# Patient Record
Sex: MALE | Race: WHITE | NOT HISPANIC OR LATINO | URBAN - METROPOLITAN AREA
[De-identification: names, ages, dates, MRNs, and addresses within clinical notes are randomized per-mention and may not be internally consistent; named-entity substitution may affect disease eponyms.]

---

## 2019-08-01 ENCOUNTER — EMERGENCY (EMERGENCY)
Facility: HOSPITAL | Age: 49
LOS: 1 days | Discharge: HOME | End: 2019-08-01
Attending: EMERGENCY MEDICINE | Admitting: EMERGENCY MEDICINE
Payer: COMMERCIAL

## 2019-08-01 VITALS
OXYGEN SATURATION: 100 % | WEIGHT: 190.04 LBS | RESPIRATION RATE: 17 BRPM | TEMPERATURE: 99 F | SYSTOLIC BLOOD PRESSURE: 129 MMHG | HEART RATE: 59 BPM | DIASTOLIC BLOOD PRESSURE: 89 MMHG | HEIGHT: 69 IN

## 2019-08-01 VITALS
HEART RATE: 60 BPM | RESPIRATION RATE: 17 BRPM | SYSTOLIC BLOOD PRESSURE: 128 MMHG | OXYGEN SATURATION: 100 % | DIASTOLIC BLOOD PRESSURE: 80 MMHG

## 2019-08-01 PROCEDURE — 72125 CT NECK SPINE W/O DYE: CPT | Mod: 26

## 2019-08-01 PROCEDURE — 99284 EMERGENCY DEPT VISIT MOD MDM: CPT | Mod: 25

## 2019-08-01 PROCEDURE — 12052 INTMD RPR FACE/MM 2.6-5.0 CM: CPT

## 2019-08-01 PROCEDURE — 70486 CT MAXILLOFACIAL W/O DYE: CPT | Mod: 26

## 2019-08-01 RX ORDER — TETANUS TOXOID, REDUCED DIPHTHERIA TOXOID AND ACELLULAR PERTUSSIS VACCINE, ADSORBED 5; 2.5; 8; 8; 2.5 [IU]/.5ML; [IU]/.5ML; UG/.5ML; UG/.5ML; UG/.5ML
0.5 SUSPENSION INTRAMUSCULAR ONCE
Refills: 0 | Status: COMPLETED | OUTPATIENT
Start: 2019-08-01 | End: 2019-08-01

## 2019-08-01 RX ORDER — IBUPROFEN 200 MG
600 TABLET ORAL ONCE
Refills: 0 | Status: COMPLETED | OUTPATIENT
Start: 2019-08-01 | End: 2019-08-01

## 2019-08-01 RX ADMIN — Medication 600 MILLIGRAM(S): at 08:47

## 2019-08-01 RX ADMIN — TETANUS TOXOID, REDUCED DIPHTHERIA TOXOID AND ACELLULAR PERTUSSIS VACCINE, ADSORBED 0.5 MILLILITER(S): 5; 2.5; 8; 8; 2.5 SUSPENSION INTRAMUSCULAR at 08:47
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
## 2019-08-01 NOTE — ED PROVIDER NOTE - PHYSICAL EXAMINATION
SKIN:       2-3 cm laceration over bottom of chin       large abrasion over R elbow       abrasion over L knuckle       abrasion over R knee    MSK: NO tenderness over 5 metacarpal regions of hands b/l, pt has full  strength and full  ROM.          NO cervical neck tenderness, pt has full ROM of neck          NO tenderness over knees b/l, pt has full SKIN:       2-3 cm laceration over bottom of chin       large abrasion over R elbow       abrasion over L knuckle       abrasion over R knee    MSK: NO tenderness over 5 metacarpal regions of hands b/l, pt has full  strength and full  ROM.          NO cervical neck tenderness, pt has full ROM of neck          NO tenderness over knees b/l, pt has full ROM on LEs, and is weight bearing as usual          Pt has mild tenderness of R jaw near coronoid process and mandibular notch

## 2019-08-01 NOTE — ED PROCEDURE NOTE - PROCEDURE ADDITIONAL DETAILS
Two deep 4.0 vicryl stitches were placed, deep layers approximated well  Ten superficial 5.0 vicryl skin stitches were placed, superficial layers approximated well
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
## 2019-08-01 NOTE — ED PROVIDER NOTE - NSFOLLOWUPINSTRUCTIONS_ED_ALL_ED_FT
YOUR STITCHES WILL ABSORB ON THEIR OWN IN 10-14 DAYS, KEEP THE AREA CLEAN AND DRY. YOU CAN START SHOWERING NORMALLY BY TOMORROW BUT AVOID SCRUBBING THE AREA OF THE WOUND AND STITCHES    APPLY THE BACITRACIN OINTMENT WITH A BANDAID ON TOO HELP AVOID INFECTION. THIS WILL ONLY BE NECESSARY FOR 2-3 DAYS    RETURN IF YOU BEGIN EXPERIENCING WORSENING PAIN, SWELLING, OR REDNESS OF THE AREA YOUR STITCHES WILL ABSORB ON THEIR OWN IN 10-14 DAYS, KEEP THE AREA CLEAN AND DRY. YOU CAN START SHOWERING NORMALLY BY TOMORROW BUT AVOID SCRUBBING THE AREA OF THE WOUND AND STITCHES    APPLY THE BACITRACIN OINTMENT WITH A BANDAID ON TOO HELP AVOID INFECTION. THIS WILL ONLY BE NECESSARY FOR 2-3 DAYS    RETURN IF YOU BEGIN EXPERIENCING WORSENING PAIN, SWELLING, OR REDNESS OF THE AREA    ALL OF YOUR CT SCAN IMAGES WERE NORMAL
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
## 2019-08-01 NOTE — ED ADULT NURSE NOTE - NSIMPLEMENTINTERV_GEN_ALL_ED
Implemented All Fall Risk Interventions:  [place] to call system. Call bell, personal items and telephone within reach. Instruct patient to call for assistance. Room bathroom lighting operational. Non-slip footwear when patient is off stretcher. Physically safe environment: no spills, clutter or unnecessary equipment. Stretcher in lowest position, wheels locked, appropriate side rails in place. Provide visual cue, wrist band, yellow gown, etc. Monitor gait and stability. Monitor for mental status changes and reorient to person, place, and time. Review medications for side effects contributing to fall risk. Reinforce activity limits and safety measures with patient and family.

## 2019-08-01 NOTE — ED PROVIDER NOTE - PROGRESS NOTE DETAILS
Constantine, pgy3: No cervical neck tenderness, midline/bony or otherwise. Pt fully A&O without distress or significant pain from any other injx (non-distracted). C-collar cleared

## 2019-08-01 NOTE — ED ADULT NURSE NOTE - CHPI ED NUR SYMPTOMS NEG
no loss of consciousness/no numbness/no vomiting/no fever/no deformity/no weakness/no tingling/no bleeding/no confusion
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
## 2019-08-06 DIAGNOSIS — S01.81XA LACERATION WITHOUT FOREIGN BODY OF OTHER PART OF HEAD, INITIAL ENCOUNTER: ICD-10-CM

## 2019-08-06 DIAGNOSIS — S19.9XXA UNSPECIFIED INJURY OF NECK, INITIAL ENCOUNTER: ICD-10-CM
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
## 2019-08-06 DIAGNOSIS — S50.311A ABRASION OF RIGHT ELBOW, INITIAL ENCOUNTER: ICD-10-CM

## 2019-08-06 DIAGNOSIS — Z23 ENCOUNTER FOR IMMUNIZATION: ICD-10-CM

## 2019-08-06 DIAGNOSIS — R68.84 JAW PAIN: ICD-10-CM
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
## 2019-08-06 DIAGNOSIS — S80.211A ABRASION, RIGHT KNEE, INITIAL ENCOUNTER: ICD-10-CM
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
## 2019-08-06 DIAGNOSIS — S60.419A ABRASION OF UNSPECIFIED FINGER, INITIAL ENCOUNTER: ICD-10-CM
